# Patient Record
Sex: FEMALE | Race: WHITE
[De-identification: names, ages, dates, MRNs, and addresses within clinical notes are randomized per-mention and may not be internally consistent; named-entity substitution may affect disease eponyms.]

---

## 2022-07-10 ENCOUNTER — HOSPITAL ENCOUNTER (EMERGENCY)
Dept: HOSPITAL 33 - ED | Age: 22
LOS: 1 days | Discharge: HOME | End: 2022-07-11
Payer: COMMERCIAL

## 2022-07-10 VITALS — OXYGEN SATURATION: 100 %

## 2022-07-10 VITALS — SYSTOLIC BLOOD PRESSURE: 116 MMHG | DIASTOLIC BLOOD PRESSURE: 64 MMHG

## 2022-07-10 VITALS — HEART RATE: 92 BPM

## 2022-07-10 DIAGNOSIS — O26.891: Primary | ICD-10-CM

## 2022-07-10 DIAGNOSIS — Z3A.01: ICD-10-CM

## 2022-07-10 DIAGNOSIS — R35.0: ICD-10-CM

## 2022-07-10 DIAGNOSIS — R10.32: ICD-10-CM

## 2022-07-10 LAB
ABO GROUP BLD: (no result)
ALBUMIN SERPL-MCNC: 4.1 G/DL (ref 3.5–5)
ALP SERPL-CCNC: 60 U/L (ref 38–126)
ALT SERPL-CCNC: 35 U/L (ref 0–35)
ANION GAP SERPL CALC-SCNC: 14.4 MEQ/L (ref 5–15)
AST SERPL QL: 26 U/L (ref 14–36)
BASOPHILS # BLD AUTO: 0.03 X10^3/UL (ref 0–0.4)
BILIRUB BLD-MCNC: 0.3 MG/DL (ref 0.2–1.3)
BUN SERPL-MCNC: 14 MG/DL (ref 7–17)
CALCIUM SPEC-MCNC: 9.6 MG/DL (ref 8.4–10.2)
CHLORIDE SERPL-SCNC: 103 MMOL/L (ref 98–107)
CO2 SERPL-SCNC: 25 MMOL/L (ref 22–30)
CREAT SERPL-MCNC: 0.58 MG/DL (ref 0.52–1.04)
EOSINOPHIL # BLD AUTO: 0.08 X10^3/UL (ref 0–0.5)
GFR SERPLBLD BASED ON 1.73 SQ M-ARVRAT: > 60 ML/MIN
GLUCOSE SERPL-MCNC: 96 MG/DL (ref 74–106)
GLUCOSE UR-MCNC: NEGATIVE MG/DL
HCT VFR BLD AUTO: 34.8 % (ref 35–47)
HGB BLD-MCNC: 11.7 G/DL (ref 12–16)
LYMPHOCYTES # SPEC AUTO: 2.66 X10^3/UL (ref 1–4.6)
MCH RBC QN AUTO: 31.8 PG (ref 26–32)
MCHC RBC AUTO-ENTMCNC: 33.6 G/DL (ref 32–36)
MONOCYTES # BLD AUTO: 0.57 X10^3/UL (ref 0–1.3)
PLATELET # BLD AUTO: 280 X10^3/UL (ref 150–450)
POTASSIUM SERPLBLD-SCNC: 3.7 MMOL/L (ref 3.5–5.1)
PROT SERPL-MCNC: 7.1 G/DL (ref 6.3–8.2)
PROT UR STRIP-MCNC: NEGATIVE MG/DL
RBC # BLD AUTO: 3.68 X10^6/UL (ref 4.1–5.4)
RBC # UR AUTO: NEGATIVE ERY/UL (ref 0–5)
RBC #/AREA URNS HPF: (no result) /HPF (ref 0–2)
RH BLD: NEGATIVE
SODIUM SERPL-SCNC: 138 MMOL/L (ref 137–145)
UA DIPSTICK PNL UR: (no result)
URINE CULTURED INDICATED?: NO
WBC # BLD AUTO: 8.6 X10^3/UL (ref 4–10.5)
WBC #/AREA URNS HPF: (no result) /HPF (ref 0–5)

## 2022-07-10 PROCEDURE — 96360 HYDRATION IV INFUSION INIT: CPT

## 2022-07-10 PROCEDURE — 36415 COLL VENOUS BLD VENIPUNCTURE: CPT

## 2022-07-10 PROCEDURE — 86901 BLOOD TYPING SEROLOGIC RH(D): CPT

## 2022-07-10 PROCEDURE — 86900 BLOOD TYPING SEROLOGIC ABO: CPT

## 2022-07-10 PROCEDURE — 76801 OB US < 14 WKS SINGLE FETUS: CPT

## 2022-07-10 PROCEDURE — 86850 RBC ANTIBODY SCREEN: CPT

## 2022-07-10 PROCEDURE — 81015 MICROSCOPIC EXAM OF URINE: CPT

## 2022-07-10 PROCEDURE — 84702 CHORIONIC GONADOTROPIN TEST: CPT

## 2022-07-10 PROCEDURE — 80053 COMPREHEN METABOLIC PANEL: CPT

## 2022-07-10 PROCEDURE — 36000 PLACE NEEDLE IN VEIN: CPT

## 2022-07-10 PROCEDURE — 85025 COMPLETE CBC W/AUTO DIFF WBC: CPT

## 2022-07-10 PROCEDURE — 99284 EMERGENCY DEPT VISIT MOD MDM: CPT

## 2022-07-11 NOTE — XRAY
Indication: First trimester cramping.



Two-dimensional transvaginal early OB ultrasound performed.



Comparison: None



Single intrauterine gestational sac with presence of single fetal pole.  Mean

crown-rump length measures 2.19 cm corresponding to 7 weeks 0 days.  Fetal

heart rate 171 BPM.  No abnormal subchorionic fluid.



Right ovary sonographically unremarkable.  Nonvisualization left ovary.  No

suspicious adnexal mass or free fluid.



Impression: Single viable intrauterine pregnancy with mean gestational age 7

weeks 0 days.  Expected date confinement is February 26, 2023.  No acute

findings.



Comment: Preliminary report was given.
no

## 2022-10-21 ENCOUNTER — HOSPITAL ENCOUNTER (EMERGENCY)
Dept: HOSPITAL 33 - ED | Age: 22
Discharge: HOME | End: 2022-10-21
Payer: COMMERCIAL

## 2022-10-21 VITALS — HEART RATE: 90 BPM | DIASTOLIC BLOOD PRESSURE: 63 MMHG | SYSTOLIC BLOOD PRESSURE: 94 MMHG | OXYGEN SATURATION: 96 %

## 2022-10-21 DIAGNOSIS — G43.909: Primary | ICD-10-CM

## 2022-10-21 DIAGNOSIS — Z33.1: ICD-10-CM

## 2022-10-21 PROCEDURE — 36000 PLACE NEEDLE IN VEIN: CPT

## 2022-10-21 PROCEDURE — 99284 EMERGENCY DEPT VISIT MOD MDM: CPT

## 2022-10-21 PROCEDURE — 96374 THER/PROPH/DIAG INJ IV PUSH: CPT

## 2022-10-21 PROCEDURE — 96375 TX/PRO/DX INJ NEW DRUG ADDON: CPT

## 2022-10-21 NOTE — ERPHSYRPT
- History of Present Illness


Time Seen by Provider: 10/21/22 12:09


Source: patient


Exam Limitations: no limitations


Patient Subjective Stated Complaint: pt to ER with complaints of headache x 3 

days. pt is 21 weeks pregnant. pt states she used to have migranes but hasnt had

any since she got pregnant.


Triage Nursing Assessment: pt A&Ox3. ambulatory. skin pwd.


Physician History: 





22 years old  1 para 0 at around 21 weeks gestation, history of migraines

not taking any medication since pregnancy presented to the ER with chief 

complaint of worsening headache all over for the last 3 days, no significant 

relief with taking Tylenol.  Headache is similar to previous episodes and does 

not think the worst headache of her life.  Patient reports yesterday it was 

worse 8/10 intensity and today is 5/10.  Denies any numbness tingling or focal 

weakness.  Denies any visual symptoms, difficulty speech or neck stiffness.  No 

fever or chills reported.  Denies any pelvic/abdominal cramping, no vaginal 

bleeding or discharge.


Timing/Duration: day(s) (3), constant, improved


Quality: sharpness


Head Pain Location: frontal, parietal


Severity of Pain-Max: severe


Severity of Pain-Current: moderate


Recent Head Trauma: no recent headache/trauma


Associated Symptoms: denies symptoms


Previous symptoms: same symptoms as today


Allergies/Adverse Reactions: 








No Known Drug Allergies Allergy (Verified 10/21/22 12:14)


   





Home Medications: 








Pnv No.121/Iron/Folic Acid [Prenatal Multivitamin Tablet] 1 tab PO DAILY 

10/21/22 [History]





Hx Tetanus, Diphtheria Vaccination/Date Given: Yes


Hx Influenza Vaccination/Date Given: Yes


Hx Pneumococcal Vaccination/Date Given: No





Travel Risk





- International Travel


Have you traveled outside of the country in past 3 weeks: No





- Coronavirus Screening


Are you exhibiting any of the following symptoms?: No





- Vaccine Status


Have you recieved a Covid-19 vaccination: Yes


: Moderna





- Vaccination Dates


Date of 2cond Vaccination (if applicable): unk





- Review of Systems


Constitutional: No Symptoms


Eyes: No Symptoms


Ears, Nose, & Throat: No Symptoms


Respiratory: No Symptoms


Cardiac: No Symptoms


Abdominal/Gastrointestinal: No Symptoms


Genitourinary Symptoms: Pregnancy


Musculoskeletal: No Symptoms


Skin: No Symptoms


Neurological: Headache


Psychological: No Symptoms


Endocrine: No Symptoms


Hematologic/Lymphatic: No Symptoms


Immunological/Allergic: No Symptoms





- Past Medical History


Pertinent Past Medical History: No


Neurological History: No Pertinent History


ENT History: No Pertinent History


Cardiac History: No Pertinent History


Respiratory History: No Pertinent History


Endocrine Medical History: No Pertinent History


Musculoskeletal History: No Pertinent History


GI Medical History: No Pertinent History


 History: No Pertinent History


Psycho-Social History: No Pertinent History


Female Reproductive Disorders: No Pertinent History


Other Medical History: frequent UTIs





- Past Surgical History


Past Surgical History: Yes


Neuro Surgical History: No Pertinent History


Cardiac: No Pertinent History


Respiratory: No Pertinent History


Gastrointestinal: No Pertinent History


Genitourinary: No Pertinent History


Musculoskeletal: No Pertinent History


Female Surgical History: No Pertinent History





- Social History


Smoking Status: Former smoker


Exposure to second hand smoke: No


Drug Use: none


Patient Lives Alone: No





- Female History


Hx Pregnant Now: Yes


Expected Date of Delivery: 23


Gestational Age: 21





- Nursing Vital Signs


Nursing Vital Signs: 


                               Initial Vital Signs











Temperature  97 F   10/21/22 12:09


 


Pulse Rate  96 H  10/21/22 12:09


 


Respiratory Rate  16   10/21/22 12:09


 


Blood Pressure  117/74   10/21/22 12:09


 


O2 Sat by Pulse Oximetry  100   10/21/22 12:09








                                   Pain Scale











Pain Intensity                 0

















- Physical Exam


General Appearance: no apparent distress, alert


Eye Exam: PERRL/EOMI


Ears, Nose, Throat Exam: normal ENT inspection, pharynx normal


Neck Exam: normal inspection, supple, full range of motion


Respiratory Exam: normal breath sounds, lungs clear


Cardiovascular Exam: regular rate/rhythm, normal heart sounds


Gastrointestinal/Abdominal Exam: soft, normal bowel sounds, other (Gravid 

uterus), No tenderness


Back Exam: normal inspection


Extremity Exam: normal inspection, normal range of motion


Mental Status Exam: alert, oriented x 3, cooperative


CNs Exam: normal hearing, normal speech, PERRL


Coordination/Gait Exam: normal finger to nose, normal gait, normal cerebellar 

function


Motor/Sensory Exam: no motor deficit, no sensory deficit, no pronator drift, 

negative Babinski's sign


DTR Exam: bicep (R): 2+, bicep (L): 2+, knee (R): 2+, knee (L): 2+


Skin Exam: normal color


**SpO2 Interpretation**: normal


SpO2: 96


O2 Delivery: Room Air


Ordered Tests: 


                               Active Orders 24 hr











 Category Date Time Status


 


 IV Insertion STAT Care  10/21/22 12:36 Completed








Medication Summary














Discontinued Medications














Generic Name Dose Route Start Last Admin





  Trade Name Jess  PRN Reason Stop Dose Admin


 


Acetaminophen  975 mg  10/21/22 12:36  10/21/22 13:45





  Acetaminophen 325 Mg Tablet  PO  10/21/22 12:37  975 mg





  STAT ONE   Administration


 


Acetaminophen  Confirm  10/21/22 13:40 





  Acetaminophen 325 Mg Tablet  Administered  10/21/22 13:41 





  Dose  





  975 mg  





  .ROUTE  





  .STK-MED ONE  


 


Diphenhydramine HCl  25 mg  10/21/22 12:36  10/21/22 13:46





  Diphenhydramine Hcl 50 Mg/Ml Vial  IV  10/21/22 12:37  25 mg





  STAT ONE   Administration


 


Diphenhydramine HCl  Confirm  10/21/22 13:40 





  Diphenhydramine Hcl 50 Mg/Ml Vial  Administered  10/21/22 13:41 





  Dose  





  50 mg  





  .ROUTE  





  .STK-MED ONE  


 


Sodium Chloride  1,000 mls @ 999 mls/hr  10/21/22 12:36  10/21/22 14:49





  Sodium Chloride 0.9% 1000 Ml  IV  10/21/22 13:36  Infused





  .Q1H1M STA   Infusion


 


Sodium Chloride  Confirm  10/21/22 13:40 





  Sodium Chloride 0.9% 1000 Ml  Administered  10/21/22 13:41 





  Dose  





  1,000 mls @ ud  





  .ROUTE  





  .STK-MED ONE  


 


Metoclopramide HCl  10 mg  10/21/22 12:36  10/21/22 13:46





  Metoclopramide Hcl 10 Mg/2 Ml Vial  IV  10/21/22 12:37  10 mg





  STAT ONE   Administration


 


Metoclopramide HCl  Confirm  10/21/22 13:40 





  Metoclopramide Hcl 10 Mg/2 Ml Vial  Administered  10/21/22 13:41 





  Dose  





  10 mg  





  .ROUTE  





  .STK-MED ONE  














- Progress


Progress: improved


Air Movement: good


Progress Note: 





10/21/22 15:13


22 years old is evaluated for worsening headache.  She does have history of 

migraine and had similar headaches in the past.  Does not think this is the 

worst headache of her life.  Her headache is actually better but she decided to 

be seen in the ER only because it was still there.  Nonfocal neuro exam 

throughout stay in the ER.  Given fluids and Reglan with Benadryl, on 

reevaluation she is feeling much better.  She has a fetal heart tone in 150s.  I

 have discussed with her primary OB Dr. MURRELL, recommendedB mag oxide 400 mg 

twice a day for prophylaxis and outpatient follow-up.  Discussed signs symptoms 

of worsening needing return to ER which she seems understanding.  Stable for 

discharge.





Blood Culture(s) Obtained: No


Antibiotics given: No


Discussed with DrAndrew: Tessa


Counseled pt/family regarding: diagnosis, need for follow-up





- Departure


Departure Disposition: Home


Clinical Impression: 


 Migraine, Pregnancy





Condition: Stable


Critical Care Time: No


Referrals: 


MATTHIAS BORDEN MD [Primary Care Provider] - Follow Up with PCP/3 days


Instructions:  Headache, Adult (DC)


Additional Instructions: 


Take magnesium oxide 400 mg twice a day for prevention of headaches.  Take 

Tylenol as needed for headache.  Follow-up with primary care and OB for 

reevaluation.  Return to ER for intractable headache, blurry vision, numbness 

tingling weakness, pelvic cramping, vaginal bleeding discharge etc.


Prescriptions: 


Magnesium Oxide 400 mg*** [Mag-Ox 400***] 400 mg PO BID #60 tablet

## 2023-02-18 ENCOUNTER — HOSPITAL ENCOUNTER (OUTPATIENT)
Dept: HOSPITAL 33 - OB.NST | Age: 23
Setting detail: OBSERVATION
Discharge: HOME | End: 2023-02-18
Attending: OBSTETRICS & GYNECOLOGY | Admitting: OBSTETRICS & GYNECOLOGY
Payer: COMMERCIAL

## 2023-02-18 VITALS — DIASTOLIC BLOOD PRESSURE: 59 MMHG | HEART RATE: 108 BPM | OXYGEN SATURATION: 99 % | SYSTOLIC BLOOD PRESSURE: 121 MMHG

## 2023-02-18 DIAGNOSIS — Z34.03: Primary | ICD-10-CM

## 2023-02-18 DIAGNOSIS — Z3A.38: ICD-10-CM

## 2023-02-18 LAB
AMPHETAMINES UR QL: NEGATIVE
BACTERIA UR CULT: NO
BARBITURATES UR QL: NEGATIVE
BENZODIAZ UR QL SCN: NEGATIVE
COCAINE UR QL SCN: NEGATIVE
METHADONE UR QL: NEGATIVE
OPIATES UR QL: NEGATIVE
PCP UR QL CFM>20 NG/ML: NEGATIVE
RBC # URNS HPF: (no result) /HPF (ref 0–5)
THC UR QL SCN: NEGATIVE
WBC URNS QL MICRO: (no result) /HPF (ref 0–5)

## 2023-02-18 PROCEDURE — 80307 DRUG TEST PRSMV CHEM ANLYZR: CPT

## 2023-02-18 PROCEDURE — 99213 OFFICE O/P EST LOW 20 MIN: CPT

## 2023-02-18 PROCEDURE — G0378 HOSPITAL OBSERVATION PER HR: HCPCS

## 2023-02-18 PROCEDURE — 81001 URINALYSIS AUTO W/SCOPE: CPT

## 2023-02-18 PROCEDURE — 59025 FETAL NON-STRESS TEST: CPT

## 2023-02-24 ENCOUNTER — HOSPITAL ENCOUNTER (INPATIENT)
Dept: HOSPITAL 33 - OB | Age: 23
LOS: 3 days | Discharge: HOME | End: 2023-02-27
Attending: OBSTETRICS & GYNECOLOGY | Admitting: OBSTETRICS & GYNECOLOGY
Payer: COMMERCIAL

## 2023-02-24 DIAGNOSIS — Z3A.39: ICD-10-CM

## 2023-02-24 DIAGNOSIS — Z20.828: ICD-10-CM

## 2023-02-24 LAB
ABO GROUP BLD: (no result)
AMPHETAMINES UR QL: NEGATIVE
BARBITURATES UR QL: NEGATIVE
BASOPHILS # BLD AUTO: 0.02 X10^3/UL (ref 0–0.4)
BASOPHILS NFR BLD AUTO: 0.2 % (ref 0–0.4)
BENZODIAZ UR QL SCN: NEGATIVE
COCAINE UR QL SCN: NEGATIVE
EOSINOPHIL # BLD AUTO: 0.06 X10^3/UL (ref 0–0.5)
HCT VFR BLD AUTO: 29 % (ref 35–47)
HGB BLD-MCNC: 9.2 G/DL (ref 12–16)
IMM GRANULOCYTES # BLD: 0.09 X10^3U/L (ref 0–0.03)
IMM GRANULOCYTES NFR BLD: 0.8 % (ref 0–0.4)
LYMPHOCYTES # SPEC AUTO: 3.43 X10^3/UL (ref 1–4.6)
MCH RBC QN AUTO: 27.5 PG (ref 26–32)
MCHC RBC AUTO-ENTMCNC: 31.7 G/DL (ref 32–36)
METHADONE UR QL: NEGATIVE
MONOCYTES # BLD AUTO: 0.79 X10^3/UL (ref 0–1.3)
NRBC # BLD AUTO: 0 X10^3U/L (ref 0–0.01)
NRBC BLD AUTO-RTO: 0 % (ref 0–0.1)
OPIATES UR QL: NEGATIVE
PCP UR QL CFM>20 NG/ML: NEGATIVE
PLATELET # BLD AUTO: 390 X10^3/UL (ref 150–450)
RBC # BLD AUTO: 3.35 X10^6/UL (ref 4.1–5.4)
RH BLD: NEGATIVE
THC UR QL SCN: NEGATIVE
WBC # BLD AUTO: 11.6 X10^3/UL (ref 4–10.5)

## 2023-02-24 PROCEDURE — 76937 US GUIDE VASCULAR ACCESS: CPT

## 2023-02-24 PROCEDURE — 90715 TDAP VACCINE 7 YRS/> IM: CPT

## 2023-02-24 PROCEDURE — 85461 HEMOGLOBIN FETAL: CPT

## 2023-02-24 PROCEDURE — 86900 BLOOD TYPING SEROLOGIC ABO: CPT

## 2023-02-24 PROCEDURE — 99140 ANES COMP EMERGENCY COND: CPT

## 2023-02-24 PROCEDURE — 62322 NJX INTERLAMINAR LMBR/SAC: CPT

## 2023-02-24 PROCEDURE — 87340 HEPATITIS B SURFACE AG IA: CPT

## 2023-02-24 PROCEDURE — G0378 HOSPITAL OBSERVATION PER HR: HCPCS

## 2023-02-24 PROCEDURE — 76942 ECHO GUIDE FOR BIOPSY: CPT

## 2023-02-24 PROCEDURE — 99213 OFFICE O/P EST LOW 20 MIN: CPT

## 2023-02-24 PROCEDURE — 64488 TAP BLOCK BI INJECTION: CPT

## 2023-02-24 PROCEDURE — 86850 RBC ANTIBODY SCREEN: CPT

## 2023-02-24 PROCEDURE — 59514 CESAREAN DELIVERY ONLY: CPT

## 2023-02-24 PROCEDURE — 36415 COLL VENOUS BLD VENIPUNCTURE: CPT

## 2023-02-24 PROCEDURE — 85610 PROTHROMBIN TIME: CPT

## 2023-02-24 PROCEDURE — 85730 THROMBOPLASTIN TIME PARTIAL: CPT

## 2023-02-24 PROCEDURE — 85025 COMPLETE CBC W/AUTO DIFF WBC: CPT

## 2023-02-24 PROCEDURE — 80307 DRUG TEST PRSMV CHEM ANLYZR: CPT

## 2023-02-24 PROCEDURE — 86901 BLOOD TYPING SEROLOGIC RH(D): CPT

## 2023-02-25 LAB
ABO GROUP BLD: (no result)
APTT PPP: 25.1 SECONDS (ref 25.1–36.5)
BASOPHILS # BLD AUTO: 0.04 X10^3/UL (ref 0–0.4)
BASOPHILS NFR BLD AUTO: 0.3 % (ref 0–0.4)
BLD GP AB SCN SERPL QL: NEGATIVE
EOSINOPHIL # BLD AUTO: 0.01 X10^3/UL (ref 0–0.5)
HCT VFR BLD AUTO: 25.7 % (ref 35–47)
HGB BLD-MCNC: 7.9 G/DL (ref 12–16)
IMM GRANULOCYTES # BLD: 0.05 X10^3U/L (ref 0–0.03)
IMM GRANULOCYTES NFR BLD: 0.3 % (ref 0–0.4)
INR PPP: 0.88 (ref 0.8–3)
LYMPHOCYTES # SPEC AUTO: 1.74 X10^3/UL (ref 1–4.6)
MCH RBC QN AUTO: 27.1 PG (ref 26–32)
MCHC RBC AUTO-ENTMCNC: 30.7 G/DL (ref 32–36)
MONOCYTES # BLD AUTO: 1.09 X10^3/UL (ref 0–1.3)
NRBC # BLD AUTO: 0 X10^3U/L (ref 0–0.01)
NRBC BLD AUTO-RTO: 0 % (ref 0–0.1)
PLATELET # BLD AUTO: 324 X10^3/UL (ref 150–450)
PROTHROMBIN TIME: 9.7 SECONDS (ref 9.4–12.5)
RBC # BLD AUTO: 2.91 X10^6/UL (ref 4.1–5.4)
RH BLD: NEGATIVE
WBC # BLD AUTO: 15.4 X10^3/UL (ref 4–10.5)

## 2023-02-25 RX ADMIN — ACETAMINOPHEN PRN MG: 500 TABLET ORAL at 23:49

## 2023-02-25 RX ADMIN — NALBUPHINE HYDROCHLORIDE PRN MG: 10 INJECTION, SOLUTION INTRAMUSCULAR; INTRAVENOUS; SUBCUTANEOUS at 23:51

## 2023-02-25 RX ADMIN — METOCLOPRAMIDE SCH MG: 5 INJECTION, SOLUTION INTRAMUSCULAR; INTRAVENOUS at 17:34

## 2023-02-25 RX ADMIN — ACETAMINOPHEN PRN MG: 500 TABLET ORAL at 02:51

## 2023-02-25 RX ADMIN — METOCLOPRAMIDE SCH MG: 5 INJECTION, SOLUTION INTRAMUSCULAR; INTRAVENOUS at 17:35

## 2023-02-26 LAB
BASOPHILS # BLD AUTO: 0.03 X10^3/UL (ref 0–0.4)
BASOPHILS NFR BLD AUTO: 0.2 % (ref 0–0.4)
EOSINOPHIL # BLD AUTO: 0.06 X10^3/UL (ref 0–0.5)
HCT VFR BLD AUTO: 23.4 % (ref 35–47)
HGB BLD-MCNC: 7.5 G/DL (ref 12–16)
IMM GRANULOCYTES # BLD: 0.03 X10^3U/L (ref 0–0.03)
IMM GRANULOCYTES NFR BLD: 0.2 % (ref 0–0.4)
LYMPHOCYTES # SPEC AUTO: 2.67 X10^3/UL (ref 1–4.6)
MCH RBC QN AUTO: 27.5 PG (ref 26–32)
MCHC RBC AUTO-ENTMCNC: 32.1 G/DL (ref 32–36)
MONOCYTES # BLD AUTO: 1.12 X10^3/UL (ref 0–1.3)
NRBC # BLD AUTO: 0 X10^3U/L (ref 0–0.01)
NRBC BLD AUTO-RTO: 0 % (ref 0–0.1)
PLATELET # BLD AUTO: 317 X10^3/UL (ref 150–450)
RBC # BLD AUTO: 2.73 X10^6/UL (ref 4.1–5.4)
WBC # BLD AUTO: 12.4 X10^3/UL (ref 4–10.5)

## 2023-02-26 RX ADMIN — IBUPROFEN PRN MG: 400 TABLET ORAL at 05:48

## 2023-02-26 RX ADMIN — HYDROCODONE BITARTRATE AND ACETAMINOPHEN PRN TAB: 5; 325 TABLET ORAL at 21:01

## 2023-02-26 RX ADMIN — FERROUS SULFATE TAB 325 MG (65 MG ELEMENTAL FE) SCH MG: 325 (65 FE) TAB at 10:34

## 2023-02-26 RX ADMIN — IBUPROFEN PRN MG: 400 TABLET ORAL at 18:11

## 2023-02-26 RX ADMIN — FERROUS SULFATE TAB 325 MG (65 MG ELEMENTAL FE) SCH MG: 325 (65 FE) TAB at 21:10

## 2023-02-26 RX ADMIN — OXYCODONE HYDROCHLORIDE AND ACETAMINOPHEN PRN TAB: 5; 325 TABLET ORAL at 16:48

## 2023-02-26 RX ADMIN — OXYCODONE HYDROCHLORIDE AND ACETAMINOPHEN PRN TAB: 5; 325 TABLET ORAL at 13:22

## 2023-02-26 RX ADMIN — DOCUSATE SODIUM SCH MG: 100 CAPSULE, LIQUID FILLED ORAL at 21:01

## 2023-02-26 RX ADMIN — NALBUPHINE HYDROCHLORIDE PRN MG: 10 INJECTION, SOLUTION INTRAMUSCULAR; INTRAVENOUS; SUBCUTANEOUS at 05:47

## 2023-02-26 RX ADMIN — FERROUS SULFATE TAB 325 MG (65 MG ELEMENTAL FE) SCH MG: 325 (65 FE) TAB at 15:30

## 2023-02-26 RX ADMIN — OXYCODONE HYDROCHLORIDE AND ACETAMINOPHEN PRN TAB: 5; 325 TABLET ORAL at 08:25

## 2023-02-26 NOTE — PCM.NOTE
Date and Time: 23





Subjective Assessment: 





pod 1 sp csection


pt resting in bed and doing well.  pt tolerating diet.


vss afebrile


abd; soft incision c/d/intact


uterus; firm


lochia; mild





hgb; 7.5





a/p sp csection pod 1


  doing well


  pt noted being anemic will give iron supplementation tid


  anticipate discharge tomorrow





OBJECTIVE DATA


Vital Signs: 


                               Vital Signs - 24 hr











  Temp Pulse Resp BP BP Pulse Ox


 


 23 06:04       100


 


 23 05:00   97 H  18   104/59  99


 


 23 04:00   96 H  18   95/54  96


 


 23 03:00       97


 


 23 02:00  98.7 F   18    97


 


 23 01:18       97


 


 23 01:00  98.7 F  115 H  18   101/54  97


 


 23 00:11       99


 


 23 00:00  98.7 F  117 H  16   105/58  102 H


 


 23 23:00       98


 


 23 22:30   117 H  18   119/67  98


 


 23 22:00       98


 


 23 21:54  98.7 F  100 H  18   112/66  98


 


 23 21:00  97.9 F  99 H  16   108/68  98


 


 23 20:45  98.8 F  101 H  16   103/69  97


 


 23 20:25  98.9 F  94 H  16   106/68  97


 


 23 20:00       97


 


 23 18:00  97.6 F  91 H  18  118/74   97


 


 23 17:30  97.6 F  105 H  18  122/73  101/57  97


 


 23 17:15  97.6 F  108 H  18  120/76   97


 


 23 17:00  97.6 F  109 H  18  115/73   97


 


 23 16:45  97.6 F  117 H  18  127/83   97


 


 23 16:30  97.6 F  92 H  18  118/75   97


 


 23 16:15  97.6 F  99 H  18  109/66   97


 


 23 16:00  97.3 F  88  18  101/57  101/57  97


 


 23 15:45  97.6 F  102 H  18  128/75   97


 


 23 15:30  97.3 F  93 H  18  121/72   97


 


 23 15:15  97.3 F  102 H  18  118/65   97


 


 23 15:00  97.3 F  98 H  18  116/72   97


 


 23 14:45  97.3 F  93 H  18  117/73   97


 


 23 14:30  97.3 F  88  18  108/64   97


 


 23 14:15  97.3 F  90  18  111/62   97


 


 23 14:00  97.3 F  85  18  107/65   97


 


 23 13:45  97.3 F  88  18  113/63   97


 


 23 13:30  97.9 F  85  18  119/79   97


 


 23 13:15  97.9 F  90  18  111/67   97


 


 23 13:00  97.9 F  85  18  112/70   97


 


 23 12:45  97.9 F  85  18  116/70   97


 


 23 12:30  97.9 F  86  18  118/72   97


 


 23 12:15  97.9 F  84  18  113/69   97


 


 23 12:00  97.9 F  84  18  114/70  113/69  97


 


 23 11:45  97.9 F  78  18  113/68   97


 


 23 11:30  97.9 F  82  18  112/68   97


 


 23 11:15  97.9 F  75  18  89/51   97


 


 23 11:00  97.9 F  73  18  92/52   97


 


 23 10:45  97.9 F  89  18  108/55   97


 


 23 10:30  97.9 F  99 H  18  126/60   97


 


 23 10:15  97.9 F  93 H  18  117/88   97


 


 23 10:00  97.9 F  93 H  18  119/67   97


 


 23 09:45   93 H  18  119/67   97


 


 23 09:30   86  18  107/57   97


 


 23 09:15   90  18  106/55   97


 


 23 09:00   90  18  106/55   97


 


 23 08:45   135 H  18  132/66   100


 


 23 08:30  97.7 F  92 H  20  130/84   100


 


 23 08:15   92 H  20  126/75   100








                        Pain Assessment - Last Documented











Pain Intensity [Bilateral      4





Lower]                         


 


Pain Intensity                 7


 


Pain Scale Used                0-10 Pain Scale











Intake and Output: 


                                 Intake & Output











 23





 11:59 11:59 11:59 11:59


 


Intake Total   6575 3500


 


Output Total    2100


 


Balance   6575 1400


 


Weight  108.862 kg  108.862 kg











Lab Results: 


                            Lab Results-Last 24 Hours











  23 Range/Units





  17:49 21:08 21:10 


 


WBC    15.4 H  (4.0-10.5)  x10^3/uL


 


RBC    2.91 L  (4.1-5.4)  x10^6/uL


 


Hgb    7.9 L  (12.0-16.0)  g/dL


 


Hct    25.7 L  (35-47)  %


 


MCV    88.3  ()  fL


 


MCH    27.1  (26-32)  pg


 


MCHC    30.7 L  (32-36)  g/dL


 


RDW    15.4 H  (11.5-14.0)  %


 


Plt Count    324  (150-450)  x10^3/uL


 


MPV    9.9  (7.5-11.0)  fL


 


Gran %    80.9 H  (36.0-66.0)  %


 


Immature Gran % (Auto)    0.3  (0.00-0.4)  %


 


Nucleat RBC Rel Count    0.0  (0.00-0.1)  %


 


Eos # (Auto)    0.01  (0-0.5)  x10^3/uL


 


Immature Gran # (Auto)    0.05 H  (0.00-0.03)  x10^3u/L


 


Absolute Lymphs (auto)    1.74  (1.0-4.6)  x10^3/uL


 


Absolute Monos (auto)    1.09  (0.0-1.3)  x10^3/uL


 


Absolute Nucleated RBC    0.00  (0.00-0.01)  x10^3u/L


 


Lymphocytes %    11.3 L  (24.0-44.0)  %


 


Monocytes %    7.1  (0.0-12.0)  %


 


Eosinophils %    0.1  (0.00-5.0)  %


 


Basophils %    0.3  (0.0-0.4)  %


 


Absolute Granulocytes    12.44 H  (1.4-6.9)  x10^3/uL


 


Basophils #    0.04  (0-0.4)  x10^3/uL


 


PT  9.7    (9.4-12.5)  SECONDS


 


INR  0.88    (0.8-3.0)  


 


APTT  25.1    (25.1-36.5)  SECONDS


 


Antibody Screen   NEGATIVE   (NEGATIVE)  


 


ABO Group   B   


 


Rh Factor   NEGATIVE   














  23 Range/Units





  05:15 


 


WBC  12.4 H  (4.0-10.5)  x10^3/uL


 


RBC  2.73 L  (4.1-5.4)  x10^6/uL


 


Hgb  7.5 L  (12.0-16.0)  g/dL


 


Hct  23.4 L  (35-47)  %


 


MCV  85.7  ()  fL


 


MCH  27.5  (26-32)  pg


 


MCHC  32.1  (32-36)  g/dL


 


RDW  15.8 H  (11.5-14.0)  %


 


Plt Count  317  (150-450)  x10^3/uL


 


MPV  9.8  (7.5-11.0)  fL


 


Gran %  68.6 H  (36.0-66.0)  %


 


Immature Gran % (Auto)  0.2  (0.00-0.4)  %


 


Nucleat RBC Rel Count  0.0  (0.00-0.1)  %


 


Eos # (Auto)  0.06  (0-0.5)  x10^3/uL


 


Immature Gran # (Auto)  0.03  (0.00-0.03)  x10^3u/L


 


Absolute Lymphs (auto)  2.67  (1.0-4.6)  x10^3/uL


 


Absolute Monos (auto)  1.12  (0.0-1.3)  x10^3/uL


 


Absolute Nucleated RBC  0.00  (0.00-0.01)  x10^3u/L


 


Lymphocytes %  21.5 L  (24.0-44.0)  %


 


Monocytes %  9.0  (0.0-12.0)  %


 


Eosinophils %  0.5  (0.00-5.0)  %


 


Basophils %  0.2  (0.0-0.4)  %


 


Absolute Granulocytes  8.49 H  (1.4-6.9)  x10^3/uL


 


Basophils #  0.03  (0-0.4)  x10^3/uL


 


PT   (9.4-12.5)  SECONDS


 


INR   (0.8-3.0)  


 


APTT   (25.1-36.5)  SECONDS


 


Antibody Screen   (NEGATIVE)  


 


ABO Group   


 


Rh Factor   














Assessment/Plan


(1) Arrest of descent, delivered, current hospitalization


Current Visit: Yes   Status: Acute   Code(s): O62.1 - SECONDARY UTERINE INERTIA 

 





(2) Arrest of dilation, delivered, current hospitalization


Current Visit: Yes   Status: Acute   Code(s): O62.1 - SECONDARY UTERINE INERTIA 

 





(3) S/P primary low transverse 


Current Visit: Yes   Status: Acute   Code(s): Z98.891 - HISTORY OF UTERINE SCAR 

FROM PREVIOUS SURGERY

## 2023-02-27 VITALS — OXYGEN SATURATION: 95 %

## 2023-02-27 LAB
BASOPHILS # BLD AUTO: 0.03 X10^3/UL (ref 0–0.4)
BASOPHILS NFR BLD AUTO: 0.2 % (ref 0–0.4)
EOSINOPHIL # BLD AUTO: 0.11 X10^3/UL (ref 0–0.5)
HCT VFR BLD AUTO: 22.9 % (ref 35–47)
HGB BLD-MCNC: 7.2 G/DL (ref 12–16)
IMM GRANULOCYTES # BLD: 0.08 X10^3U/L (ref 0–0.03)
IMM GRANULOCYTES NFR BLD: 0.6 % (ref 0–0.4)
LYMPHOCYTES # SPEC AUTO: 2.24 X10^3/UL (ref 1–4.6)
MCH RBC QN AUTO: 27.6 PG (ref 26–32)
MCHC RBC AUTO-ENTMCNC: 31.4 G/DL (ref 32–36)
MONOCYTES # BLD AUTO: 0.91 X10^3/UL (ref 0–1.3)
NRBC # BLD AUTO: 0 X10^3U/L (ref 0–0.01)
NRBC BLD AUTO-RTO: 0 % (ref 0–0.1)
PLATELET # BLD AUTO: 327 X10^3/UL (ref 150–450)
RBC # BLD AUTO: 2.61 X10^6/UL (ref 4.1–5.4)
WBC # BLD AUTO: 14.2 X10^3/UL (ref 4–10.5)

## 2023-02-27 RX ADMIN — HYDROCODONE BITARTRATE AND ACETAMINOPHEN PRN TAB: 5; 325 TABLET ORAL at 07:59

## 2023-02-27 RX ADMIN — FERROUS SULFATE TAB 325 MG (65 MG ELEMENTAL FE) SCH MG: 325 (65 FE) TAB at 10:01

## 2023-02-27 RX ADMIN — IBUPROFEN PRN MG: 400 TABLET ORAL at 06:00

## 2023-02-27 RX ADMIN — HYDROCODONE BITARTRATE AND ACETAMINOPHEN PRN TAB: 5; 325 TABLET ORAL at 02:27

## 2023-02-27 RX ADMIN — DOCUSATE SODIUM SCH MG: 100 CAPSULE, LIQUID FILLED ORAL at 10:01

## 2023-02-27 RX ADMIN — HYDROCODONE BITARTRATE AND ACETAMINOPHEN PRN TAB: 5; 325 TABLET ORAL at 12:19

## 2023-02-27 RX ADMIN — IBUPROFEN PRN MG: 400 TABLET ORAL at 00:01

## 2023-02-27 NOTE — OP
SURGERY DATE/TIME:  2023



PREOPERATIVE DIAGNOSIS:     Intrauterine pregnancy at 39 weeks gestation with arrest of 
descent and dilatation.  



POSTOPERATIVE DIAGNOSIS:    Intrauterine pregnancy at 39 weeks gestation with arrest of 
descent and dilatation.  



PROCEDURE:  Primary  section, low flap transverse uterine incision, Pfannenstiel 
skin incision.



SURGEON:        Marcelo Allen D.O.



ASSISTANT:      Janay Toro, surgical technicians.



ANESTHESIA:    Epidural. 



ESTIMATED BLOOD LOSS:  870 cc. 



COMPLICATIONS:  None. 



INDICATIONS:  The risks, benefits, indications and alternatives of the procedure were 
reviewed with the patient prior to procedure. The patient understood the risk of 
infection, bleeding, bowel injury, bladder injury, ureteral injury, uterine perforation, 
pelvic infection and thromboembolic disorder associated with this surgery and desires to 
have this surgery as a possible means to alleviate her current medical condition. 



DESCRIPTION OF PROCEDURE AND FINDINGS:   At this point the patient is taken to the 
operating room where epidural anesthesia was found to be adequate. She was then prepared 
and draped in normal sterile fashion in the dorsal supine position with leftward tilt. A 
Pfannenstiel skin incision is made with a scalpel and carried through to the underlying 
layer of the fascia with a Bovie. The fascia was then incised in the midline and the 
incision extended laterally with Delgadillo scissors. The superior aspect of the fascial 
incision was then grasped Kocher clamps elevated and the underlying rectus muscles 
dissected off bluntly. Attention is then turned to the inferior aspect of this incision 
which in similar fashion was grasped, tented up with Kocher clamps and the rectus muscles 
dissected off bluntly. The rectus muscles were then  at the midline and the 
peritoneum identified, tented up and entered sharply with Metzenbaum scissors. The 
peritoneal incision was then extended superiorly and inferiorly with good visualization of 
the bladder. From this point, an Tani retractor was placed into the peritoneum as a 
means to retract the abdominal content. From this point a transverse incision was made 
along the lower uterine segment and at this point uterine incision was extended laterally 
with bandage scissors. At this point the infants head was then removed and delivered 
atraumatically. The nose and mouth were suctioned with bulb suction and the cord clamped 
and cut. The infant was then handed off to the awaiting nurses. The placenta was then 
removed manually. The uterus exteriorized and cleared of all clots and debris. The uterine 
incision was repaired with 1-0 chromic in a running locked fashion. A second layer of the 
same suture was used to obtain excellent hemostasis. From this point, the uterus is then 
returned to the abdomen. The gutters were cleared of clots. The Tani retractor was then 
removed. From this point the peritoneal muscles were closed with interrupted suture using 
2-0 chromic suture. The fascia was re-approximated with 0 Vicryl in a running fashion. The 
subcutaneous layer was closed with 3-0 Vicryl suture and the skin was closed with 
absorbable staples called INSORB. The patient tolerated the procedure well. Sponge, lap, 
needle and instrument counts were correct x2. The patient was then taken to the recovery 
room in stable condition. The patient delivered a live baby boy at 1840 hours. Apgar's 
were 9 at 1 minute and 9 at 5 minutes. The weight of the baby was 9 pounds and 10 ounces.

## 2023-02-27 NOTE — PCM.DS
Discharge Summary


Date of Admission: 


23 08:29





Admitting Physician: 


NILTON GARCÍA DO





Consults: 





                                Consults on Case





23 17:00


Notify  Anesthesia Provider PRN 





23 17:07


Notify  Anesthesia Provider ROUTINE 











Primary Care Provider: 


MATTHIAS BORDEN MD








Allergies


Allergies





No Known Drug Allergies Allergy (Verified 23 22:31)


   











Hospital Summary





- Hospital Course


Hospital Course: 





pt was admitted on  for induction at 38 6/7 wks gestation 

where cytotec was started and had 3 doses followed by pitocin.  pt had arom on 

 with placement of iupc and fetal electrode.  after several hours of p

itocin with adequate uterine contx with no cervical change it was determined to 

proceed with primary csection. pt delivered live baby boy without complication 

and was noted being a boy at 8 pounds 10 oz.  during postop period did well 

however hgb was at 7.2 on  down from 9.2 preoperatively.  pt ambulating 

and tolerating diet and has been taking iron supplementation tid.  hgb has been 

stable and pt at this time stable for discharge to fu in office this friday for 

incision check.  norco was sent to her pharmacy for pain management.  all 

questions answered to her satisfaction and pt at this time stable for discharge.





- Vitals & Intake/Output


Vital Signs: 





                                   Vital Signs











Temperature  98.7 F   23 02:30


 


Pulse Rate  116 H  23 02:30


 


Respiratory Rate  20   23 02:30


 


Blood Pressure  111/72   23 02:30


 


O2 Sat by Pulse Oximetry  97   23 02:30











Intake & Output: 





                                 Intake & Output











 23





 11:59 11:59 11:59 11:59


 


Intake Total  6575 3500 650


 


Output Total   2100 1250


 


Balance  6575 1400 -600


 


Weight 108.862 kg  108.862 kg 














- Lab


Result Diagrams: 


                                 23 05:07





Lab Results-Last 24 Hrs: 





                            Lab Results-Last 24 Hours











  23 Range/Units





  17:46 21:15 05:07 


 


WBC    14.2 H  (4.0-10.5)  x10^3/uL


 


RBC    2.61 L  (4.1-5.4)  x10^6/uL


 


Hgb    7.2 L  (12.0-16.0)  g/dL


 


Hct    22.9 L  (35-47)  %


 


MCV    87.7  ()  fL


 


MCH    27.6  (26-32)  pg


 


MCHC    31.4 L  (32-36)  g/dL


 


RDW    16.5 H  (11.5-14.0)  %


 


Plt Count    327  (150-450)  x10^3/uL


 


MPV    9.9  (7.5-11.0)  fL


 


Gran %    76.3 H  (36.0-66.0)  %


 


Immature Gran % (Auto)    0.6 H  (0.00-0.4)  %


 


Nucleat RBC Rel Count    0.0  (0.00-0.1)  %


 


Eos # (Auto)    0.11  (0-0.5)  x10^3/uL


 


Immature Gran # (Auto)    0.08 H  (0.00-0.03)  x10^3u/L


 


Absolute Lymphs (auto)    2.24  (1.0-4.6)  x10^3/uL


 


Absolute Monos (auto)    0.91  (0.0-1.3)  x10^3/uL


 


Absolute Nucleated RBC    0.00  (0.00-0.01)  x10^3u/L


 


Lymphocytes %    15.7 L  (24.0-44.0)  %


 


Monocytes %    6.4  (0.0-12.0)  %


 


Eosinophils %    0.8  (0.00-5.0)  %


 


Basophils %    0.2  (0.0-0.4)  %


 


Absolute Granulocytes    10.86 H  (1.4-6.9)  x10^3/uL


 


Basophils #    0.03  (0-0.4)  x10^3/uL


 


Hep Bs Antigen  Negative    (Negative)  


 


Fetal Screen   SEE SEPARATE REPORT   














- Procedures and Test


Procedures and Tests throughout Hospitalization: 





                            Therapy Orders & Screens





23 22:00


Smoking Cessation Education ONCE 


   Comment: 


   Diagnosis: IUP; IOL


   Smoking Status: Current every day smoker


   How long have you smoked: 3 years


   Have you smoked in the past 12 months: Yes


   Approximately how many cigarettes per day: vapes 8 x per day


   Do you dip or chew tobacco: No














Final Diagnosis/Problem List





- Final Discharge Diagnosis/Problem


(1) Arrest of descent, delivered, current hospitalization


Current Visit: Yes   Status: Acute   Code(s): O62.1 - SECONDARY UTERINE INERTIA 

 





(2) Arrest of dilation, delivered, current hospitalization


Current Visit: Yes   Status: Acute   Code(s): O62.1 - SECONDARY UTERINE INERTIA 

 





(3) S/P primary low transverse 


Current Visit: Yes   Status: Acute   Code(s): Z98.891 - HISTORY OF UTERINE SCAR 

FROM PREVIOUS SURGERY   





- Discharge


Disposition: Home, Self-Care


Condition: Stable


Prescriptions: 


New


   Hydrocodone/Acetaminophen [Hydrocodone-Acetamin 5-325 mg***] 1 tab PO Q6HPRN 

PRN #28 tablet MDD 4


     PRN Reason: Pain





No Action


   Pnv No.121/Iron/Folic Acid [Prenatal Multivitamin Tablet] 1 tab PO DAILY


   Magnesium Oxide 400 mg*** [Mag-Ox 400***] 400 mg PO BID PRN


     PRN Reason: Pain


   Aspirin 81 gm Chew*** [Baby Aspirin 81 mg Chew***] 81 mg PO DAILY


Follow up with: 


MATTHIAS BORDEN MD [Primary Care Provider] - 


NILTON GARCÍA DO [ACTIVE STAFF] - 7 Days (should fu in office this friday for 

dressing removal)

## 2023-02-27 NOTE — PCM.NOTE
Date and Time: 23  0853





Subjective Assessment: 





pod 1 sp csection


pt resting in bed and doing well.  able to ambulate and tolerate diet


vss afebrile


abd; soft incision c/d/intact dressing with minimal soiling


uterus; firm


lochia; mild





hgb; 7.2





a/p sp csection pod 2


  dc home today


  should fu friday for dressing removal





OBJECTIVE DATA


Vital Signs: 


                               Vital Signs - 24 hr











  Temp Pulse Resp BP Pulse Ox


 


 23 02:30  98.7 F  116 H  20  111/72  97


 


 23 23:39  99.5 F    


 


 23 20:00  99.2 F  120 H  18  118/70  98


 


 23 18:00  99.6 F    


 


 23 14:00  98.4 F  119 H  18  122/60  100


 


 23 09:00  97.7 F  103 H  18  103/53  99








                        Pain Assessment - Last Documented











Pain Intensity [Bilateral      7





Lower]                         


 


Pain Intensity                 6


 


Pain Scale Used                0-10 Pain Scale











Intake and Output: 


                                 Intake & Output











 23





 11:59 11:59 11:59 11:59


 


Intake Total  6575 3500 650


 


Output Total   2100 1250


 


Balance  6575 1400 -600


 


Weight 108.862 kg  108.862 kg 











Lab Results: 


                            Lab Results-Last 24 Hours











  23 Range/Units





  17:46 21:15 05:07 


 


WBC    14.2 H  (4.0-10.5)  x10^3/uL


 


RBC    2.61 L  (4.1-5.4)  x10^6/uL


 


Hgb    7.2 L  (12.0-16.0)  g/dL


 


Hct    22.9 L  (35-47)  %


 


MCV    87.7  ()  fL


 


MCH    27.6  (26-32)  pg


 


MCHC    31.4 L  (32-36)  g/dL


 


RDW    16.5 H  (11.5-14.0)  %


 


Plt Count    327  (150-450)  x10^3/uL


 


MPV    9.9  (7.5-11.0)  fL


 


Gran %    76.3 H  (36.0-66.0)  %


 


Immature Gran % (Auto)    0.6 H  (0.00-0.4)  %


 


Nucleat RBC Rel Count    0.0  (0.00-0.1)  %


 


Eos # (Auto)    0.11  (0-0.5)  x10^3/uL


 


Immature Gran # (Auto)    0.08 H  (0.00-0.03)  x10^3u/L


 


Absolute Lymphs (auto)    2.24  (1.0-4.6)  x10^3/uL


 


Absolute Monos (auto)    0.91  (0.0-1.3)  x10^3/uL


 


Absolute Nucleated RBC    0.00  (0.00-0.01)  x10^3u/L


 


Lymphocytes %    15.7 L  (24.0-44.0)  %


 


Monocytes %    6.4  (0.0-12.0)  %


 


Eosinophils %    0.8  (0.00-5.0)  %


 


Basophils %    0.2  (0.0-0.4)  %


 


Absolute Granulocytes    10.86 H  (1.4-6.9)  x10^3/uL


 


Basophils #    0.03  (0-0.4)  x10^3/uL


 


Hep Bs Antigen  Negative    (Negative)  


 


Fetal Screen   SEE SEPARATE REPORT   














Assessment/Plan


(1) Arrest of descent, delivered, current hospitalization


Current Visit: Yes   Status: Acute   Code(s): O62.1 - SECONDARY UTERINE INERTIA 

 





(2) Arrest of dilation, delivered, current hospitalization


Current Visit: Yes   Status: Acute   Code(s): O62.1 - SECONDARY UTERINE INERTIA 

 





(3) S/P primary low transverse 


Current Visit: Yes   Status: Acute   Code(s): Z98.891 - HISTORY OF UTERINE SCAR 

FROM PREVIOUS SURGERY

## 2023-03-01 VITALS — HEART RATE: 98 BPM | SYSTOLIC BLOOD PRESSURE: 123 MMHG | DIASTOLIC BLOOD PRESSURE: 58 MMHG

## 2023-04-02 ENCOUNTER — HOSPITAL ENCOUNTER (EMERGENCY)
Dept: HOSPITAL 33 - ED | Age: 23
LOS: 1 days | Discharge: HOME | End: 2023-04-03
Payer: MEDICAID

## 2023-04-02 VITALS — DIASTOLIC BLOOD PRESSURE: 76 MMHG | SYSTOLIC BLOOD PRESSURE: 113 MMHG

## 2023-04-02 DIAGNOSIS — D64.9: ICD-10-CM

## 2023-04-02 DIAGNOSIS — Z72.0: ICD-10-CM

## 2023-04-02 PROCEDURE — 99283 EMERGENCY DEPT VISIT LOW MDM: CPT

## 2023-04-02 PROCEDURE — 36000 PLACE NEEDLE IN VEIN: CPT

## 2023-04-02 NOTE — ERPHSYRPT
- History of Present Illness


Time Seen by Provider: 04/02/23 23:35


Source: patient


Exam Limitations: no limitations


Physician History: 


Patient presents to ED from [facility] ER w/ a 2 week hx of vaginal bleeding. She

is 5 weeks postpartum s/p c/s by Dr. García. Pregnancy was uncomplicated and no 

postpartum hemorrhage. Patient is a G1 now P1. She reports large golf ball size 

clots multiple times per day over the past 2 weeks. She denies abd pain, nausea,

vomiting, fevers, CP or SOB. 


Timing/Duration: week(s) (2)


Activites at Onset: none


Pain Radiation: none


Severity of Pain-Max: none


Severity of Pain-Current: none


Prior abdominal problems: other (s/p c/s)


Sexual intercourse history: not active


Associated Symptoms: No fever, No chills, No diaphoresis, No nausea, No 

vomiting, No dysuria


Allergies/Adverse Reactions: 








No Known Drug Allergies Allergy (Verified 04/02/23 23:45)


   





Hx Tetanus, Diphtheria Vaccination/Date Given: Yes


Hx Influenza Vaccination/Date Given: Yes


Hx Pneumococcal Vaccination/Date Given: No





Travel Risk





- Vaccine Status


Have you recieved a Covid-19 vaccination: Yes


: Moderna





- Vaccination Dates


Date of 2cond Vaccination (if applicable): 11/30/21





- Review of Systems


Constitutional: No Symptoms


Respiratory: No Symptoms


Cardiac: No Symptoms


Abdominal/Gastrointestinal: No Symptoms


Genitourinary Symptoms: Vaginal Bleeding


Skin: No Symptoms


Neurological: Dizziness





- Past Medical History


Pertinent Past Medical History: Yes


Neurological History: Migraines


ENT History: No Pertinent History


Cardiac History: Other


Respiratory History: No Pertinent History


Endocrine Medical History: No Pertinent History


Musculoskeletal History: No Pertinent History


GI Medical History: Hemorrhoids, Other


 History: No Pertinent History


Psycho-Social History: Bipolar, Depression


Female Reproductive Disorders: No Pertinent History


Other Medical History: ASD AND VSD CARDIAC HISTORY CLOSED AT AGE 7 NO SURGERY.  

HX ANAL FISSURES





- Past Surgical History


Past Surgical History: Yes


Neuro Surgical History: No Pertinent History


Cardiac: No Pertinent History, Angioplasty


Respiratory: No Pertinent History


Gastrointestinal: No Pertinent History


Genitourinary: No Pertinent History


Musculoskeletal: No Pertinent History


Female Surgical History: No Pertinent History


Other Surgical History: WISDOM TEETH REMOVED





- Social History


Smoking Status: Current every day smoker


How long have you smoked: 3 years


Exposure to second hand smoke: No


Drug Use: none


Patient Lives Alone: No





- Nursing Vital Signs


Nursing Vital Signs: 


                               Initial Vital Signs











Temperature  97.9 F   04/02/23 23:34


 


Pulse Rate  95 H  04/02/23 23:34


 


Respiratory Rate  16   04/02/23 23:34


 


Blood Pressure  113/76   04/02/23 23:34


 


O2 Sat by Pulse Oximetry  99   04/02/23 23:34








                                   Pain Scale











Pain Intensity                 0

















- Physical Exam


General Appearance: no apparent distress


Eye Exam: eyes nml inspection


Ears, Nose, Throat Exam: normal ENT inspection


Respiratory Exam: airway intact, No respiratory distress


Cardiovascular Exam: regular rate/rhythm


Gastrointestinal/Abdomen Exam: soft, normal bowel sounds, No tenderness, No 

distention, No mass, No guarding, No rebound


Neurologic Exam: alert, oriented x 3, cooperative


Skin Exam: warm, dry, pale


**SpO2 Interpretation**: normal


O2 Delivery: Room Air





- Course


Nursing assessment & vital signs reviewed: Yes


Ordered Tests: 


Medication Summary














Discontinued Medications














Generic Name Dose Route Start Last Admin





  Trade Name Freq  PRN Reason Stop Dose Admin


 


Tranexamic Acid 1,000 mg/  110 mls @ 660 mls/hr  04/02/23 23:43  04/03/23 00:11





  Sodium Chloride  IV  04/02/23 23:52  660 mls/hr





  STAT ONE   Administration


 


Sodium Chloride  Confirm  04/03/23 00:07 





  Sodium Chloride 0.9%  Administered  04/03/23 00:08 





  Dose  





  100 mls @ ud  





  .ROUTE  





  .STK-MED ONE  


 


Tranexamic Acid  Confirm  04/03/23 00:07 





  Tranexamic Acid 1000 Mg/10 Ml  Vial/Amp  Administered  04/03/23 00:08 





  Dose  





  1,000 mg  





  .ROUTE  





  .STK-MED ONE  














- Progress


Progress: unchanged


Air Movement: good


Progress Note: 





04/03/23 00:19


Labs from [facility] ER brought in w/ patient and showed a Hb of 8.8. I discussed

case w/ Dr. García who recommended 1g of TXA in ED and d/c home 650mg of TXA TID 

x 5 days and f/u in office next week. Patient agreeable w/ plan. 


Blood Culture(s) Obtained: No


Antibiotics given: No


Discussed with DrAndrew: Other (Tiffany)


Counseled pt/family regarding: need for follow-up





Medical Desision Making





- Risk of complications


Low Risk: Low risk of morbidity from additional dx testing or treatment





- Departure


Departure Disposition: Home


Clinical Impression: 


 Postpartum bleeding, Anemia





Condition: Good


Critical Care Time: No


Referrals: 


MATTHIAS BORDEN MD [Primary Care Provider] - Follow up/PCP as directed


NILTON GARCÍA DO [ACTIVE STAFF] - Follow up/PCP as directed


Instructions:  Anemia, Possibly From Low Iron, Adult ED


Prescriptions: 


Tranexamic Acid 650 mg PO TID 5 Days #15 tablet

## 2023-04-03 VITALS — OXYGEN SATURATION: 98 % | HEART RATE: 90 BPM
